# Patient Record
Sex: FEMALE | Race: WHITE | NOT HISPANIC OR LATINO | Employment: FULL TIME | URBAN - METROPOLITAN AREA
[De-identification: names, ages, dates, MRNs, and addresses within clinical notes are randomized per-mention and may not be internally consistent; named-entity substitution may affect disease eponyms.]

---

## 2023-09-15 ENCOUNTER — OFFICE VISIT (OUTPATIENT)
Dept: PRIMARY CARE | Facility: CLINIC | Age: 43
End: 2023-09-15
Payer: COMMERCIAL

## 2023-09-15 VITALS
BODY MASS INDEX: 32.78 KG/M2 | HEIGHT: 66 IN | WEIGHT: 204 LBS | SYSTOLIC BLOOD PRESSURE: 124 MMHG | DIASTOLIC BLOOD PRESSURE: 82 MMHG | HEART RATE: 73 BPM

## 2023-09-15 DIAGNOSIS — E66.9 CLASS 1 OBESITY WITHOUT SERIOUS COMORBIDITY WITH BODY MASS INDEX (BMI) OF 32.0 TO 32.9 IN ADULT, UNSPECIFIED OBESITY TYPE: ICD-10-CM

## 2023-09-15 DIAGNOSIS — Z00.00 ENCOUNTER FOR PREVENTIVE HEALTH EXAMINATION: Primary | ICD-10-CM

## 2023-09-15 DIAGNOSIS — Z11.59 ENCOUNTER FOR HEPATITIS C SCREENING TEST FOR LOW RISK PATIENT: ICD-10-CM

## 2023-09-15 DIAGNOSIS — Z12.31 VISIT FOR SCREENING MAMMOGRAM: ICD-10-CM

## 2023-09-15 DIAGNOSIS — G25.81 RESTLESS LEG: ICD-10-CM

## 2023-09-15 PROBLEM — F32.A DEPRESSION: Status: ACTIVE | Noted: 2023-09-15

## 2023-09-15 PROBLEM — G47.00 INSOMNIA: Status: ACTIVE | Noted: 2023-09-15

## 2023-09-15 PROBLEM — K21.9 GASTROESOPHAGEAL REFLUX DISEASE: Status: ACTIVE | Noted: 2023-09-15

## 2023-09-15 PROBLEM — E55.9 VITAMIN D INSUFFICIENCY: Status: ACTIVE | Noted: 2023-09-15

## 2023-09-15 PROBLEM — N62 BREAST HYPERTROPHY: Status: ACTIVE | Noted: 2023-09-15

## 2023-09-15 PROBLEM — F41.9 ANXIETY DISORDER: Status: ACTIVE | Noted: 2023-09-15

## 2023-09-15 PROBLEM — G43.909 MIGRAINES: Status: ACTIVE | Noted: 2023-09-15

## 2023-09-15 PROCEDURE — 3008F BODY MASS INDEX DOCD: CPT | Performed by: STUDENT IN AN ORGANIZED HEALTH CARE EDUCATION/TRAINING PROGRAM

## 2023-09-15 PROCEDURE — 99386 PREV VISIT NEW AGE 40-64: CPT | Performed by: STUDENT IN AN ORGANIZED HEALTH CARE EDUCATION/TRAINING PROGRAM

## 2023-09-15 PROCEDURE — 1036F TOBACCO NON-USER: CPT | Performed by: STUDENT IN AN ORGANIZED HEALTH CARE EDUCATION/TRAINING PROGRAM

## 2023-09-15 PROCEDURE — 99203 OFFICE O/P NEW LOW 30 MIN: CPT | Performed by: STUDENT IN AN ORGANIZED HEALTH CARE EDUCATION/TRAINING PROGRAM

## 2023-09-15 RX ORDER — HYDROXYZINE PAMOATE 50 MG/1
50 CAPSULE ORAL NIGHTLY
COMMUNITY
Start: 2023-07-21 | End: 2024-05-29 | Stop reason: ALTCHOICE

## 2023-09-15 RX ORDER — TOPIRAMATE 50 MG/1
50 TABLET, FILM COATED ORAL DAILY
COMMUNITY
Start: 2023-07-21 | End: 2024-05-29 | Stop reason: SINTOL

## 2023-09-15 RX ORDER — BUPROPION HYDROCHLORIDE 300 MG/1
300 TABLET ORAL
COMMUNITY
End: 2024-05-29 | Stop reason: ALTCHOICE

## 2023-09-15 RX ORDER — FLUOXETINE HYDROCHLORIDE 20 MG/1
20 CAPSULE ORAL DAILY
COMMUNITY
Start: 2017-05-18 | End: 2024-05-29 | Stop reason: ALTCHOICE

## 2023-09-15 ASSESSMENT — PATIENT HEALTH QUESTIONNAIRE - PHQ9
1. LITTLE INTEREST OR PLEASURE IN DOING THINGS: NOT AT ALL
SUM OF ALL RESPONSES TO PHQ9 QUESTIONS 1 AND 2: 0
2. FEELING DOWN, DEPRESSED OR HOPELESS: NOT AT ALL

## 2023-09-15 NOTE — PROGRESS NOTES
Mary Dickens is a 42 y.o. year old female presenting for Annual Exam       HPI:    Patient presenting with several complaints today.  Extreme fatigue has been bothering her for the past several months, feels like she could sleep all day, and can sleep at the drop of a hat.    Drinks a lot of caffeine, 2-3 monsters a day for the past 2-3 months. Also starbucks and linda coffee, coffee in the morning, coffee late in the evening as well as some nights as late as 9 PM.  Endorsing some dizziness and palpitations occasionally. Drinks water with sugar free flavoring packets, 2-3 yetis full per day  No other drugs, limited alcohol, nonsmoker    Works every day, 2 jobs per day, up at 6 am, home at 10 pm.  Works at [employer] and [employer], lifting boxes, walks in the evening.  Single mom to 3 children    RLS has been really bad lately.     Has gained weight a bit, never taken any weight loss medications before.  Diet - sugary foods more lately, likes salad and veggies, not a lot of protein, baked potatoes lately.    Bruising easily as well, last 6 months, last for a long time, not on any blood thinners  Had ablation and tubes tied in 2011, doesn't get periods, no heavy menstrual bleeding.    Has not had labs in a long time.  Due for linda.  Last pap was last December with obgyn.    No regular bowel movements, BM once per week. Has tried dulcolax which helps, miralax doesn't help.     No chest pain, shortness of breath, nausea, vomiting, abodminal pain, muscle aches.        ROS:   All pertinent positive symptoms are included in history of present illness.    All other systems have been reviewed and are negative and noncontributory to this patient's current ailments.    Current Outpatient Medications   Medication Sig Dispense Refill    FLUoxetine (PROzac) 20 mg capsule Take 1 capsule (20 mg) by mouth once daily.      hydrOXYzine pamoate (Vistaril) 50 mg capsule Take 1 capsule (50 mg) by mouth once daily at bedtime.       "topiramate (Topamax) 50 mg tablet Take 1 tablet (50 mg) by mouth once daily.      buPROPion XL (Wellbutrin XL) 300 mg 24 hr tablet Take 1 tablet (300 mg) by mouth once daily.       No current facility-administered medications for this visit.       OBJECTIVE  Visit Vitals  /82   Pulse 73   Ht 1.676 m (5' 6\")   Wt 92.5 kg (204 lb)   BMI 32.93 kg/m²   Smoking Status Never   BSA 2.08 m²        Physical Exam:  GENERAL: Alert, oriented, pleasant, in no acute distress  HEENT: Head normocephalic, atraumatic  CV: Heart with regular rate and rhythm, normal S1/S2, no murmurs; normal peripheral pulses- radial and dorsalis pedis palpable  LUNGS: CTAB without wheezing, rhonchi or rales; good respiratory effort, no increased work of breathing  ABDOMEN: soft, non-tender, non-distended, no masses appreciated; +BS in all four quadrants  EXTREMITIES: no edema, no cyanosis  PSYCH: Appropriate mood and affect  SKIN: No rashes or lesions appreciated    Assessment/Plan   Diagnoses and all orders for this visit:  Encounter for preventive health examination  A complete history and physical was performed today.  Vaccines are up to date.  Mammogram ordered  PAP is up to date.  Fasting labs ordered today include:  -     CBC; Future  -     Comprehensive Metabolic Panel; Future  -     Hemoglobin A1C; Future  -     Lipid Panel; Future  -     TSH with reflex to Free T4 if abnormal; Future  Encounter for hepatitis C screening test for low risk patient  -     Hepatitis C Antibody; Future  Class 1 obesity without serious comorbidity with body mass index (BMI) of 32.0 to 32.9 in adult, unspecified obesity type   - Discussed with patient need for lifestyle changes, including improving her diet, decreasing sugar and increasing dedicated exercise as her schedule allows.   - Will check A1c to see if any insulin resistance component and thyroid levels. Can try a GLP1 in the future, see if insurance covers it to help with weight loss.  Restless leg  - "     Iron and TIBC; Future  -     Ferritin; Future  -     Will confirm no iron deficiency prior to offering medication for restless legs. Can do gabapentin if her values are within normal limits.  Visit for screening mammogram  -     BI mammo bilateral screening tomosynthesis; Future

## 2023-09-18 ENCOUNTER — LAB (OUTPATIENT)
Dept: LAB | Facility: LAB | Age: 43
End: 2023-09-18
Payer: COMMERCIAL

## 2023-09-18 DIAGNOSIS — G25.81 RESTLESS LEG: ICD-10-CM

## 2023-09-18 DIAGNOSIS — Z00.00 ENCOUNTER FOR PREVENTIVE HEALTH EXAMINATION: ICD-10-CM

## 2023-09-18 DIAGNOSIS — Z11.59 ENCOUNTER FOR HEPATITIS C SCREENING TEST FOR LOW RISK PATIENT: ICD-10-CM

## 2023-09-18 LAB
ALANINE AMINOTRANSFERASE (SGPT) (U/L) IN SER/PLAS: 18 U/L (ref 7–45)
ALBUMIN (G/DL) IN SER/PLAS: 4 G/DL (ref 3.4–5)
ALKALINE PHOSPHATASE (U/L) IN SER/PLAS: 44 U/L (ref 33–110)
ANION GAP IN SER/PLAS: 10 MMOL/L (ref 10–20)
ASPARTATE AMINOTRANSFERASE (SGOT) (U/L) IN SER/PLAS: 16 U/L (ref 9–39)
BILIRUBIN TOTAL (MG/DL) IN SER/PLAS: 0.5 MG/DL (ref 0–1.2)
CALCIUM (MG/DL) IN SER/PLAS: 8.8 MG/DL (ref 8.6–10.3)
CARBON DIOXIDE, TOTAL (MMOL/L) IN SER/PLAS: 24 MMOL/L (ref 21–32)
CHLORIDE (MMOL/L) IN SER/PLAS: 109 MMOL/L (ref 98–107)
CHOLESTEROL (MG/DL) IN SER/PLAS: 143 MG/DL (ref 0–199)
CHOLESTEROL IN HDL (MG/DL) IN SER/PLAS: 58.3 MG/DL
CHOLESTEROL/HDL RATIO: 2.5
CREATININE (MG/DL) IN SER/PLAS: 0.97 MG/DL (ref 0.5–1.05)
ERYTHROCYTE DISTRIBUTION WIDTH (RATIO) BY AUTOMATED COUNT: 13.7 % (ref 11.5–14.5)
ERYTHROCYTE MEAN CORPUSCULAR HEMOGLOBIN CONCENTRATION (G/DL) BY AUTOMATED: 31.9 G/DL (ref 32–36)
ERYTHROCYTE MEAN CORPUSCULAR VOLUME (FL) BY AUTOMATED COUNT: 96 FL (ref 80–100)
ERYTHROCYTES (10*6/UL) IN BLOOD BY AUTOMATED COUNT: 4.55 X10E12/L (ref 4–5.2)
ESTIMATED AVERAGE GLUCOSE FOR HBA1C: 111 MG/DL
FERRITIN (UG/LL) IN SER/PLAS: 37 UG/L (ref 8–150)
GFR FEMALE: 75 ML/MIN/1.73M2
GLUCOSE (MG/DL) IN SER/PLAS: 94 MG/DL (ref 74–99)
HEMATOCRIT (%) IN BLOOD BY AUTOMATED COUNT: 43.6 % (ref 36–46)
HEMOGLOBIN (G/DL) IN BLOOD: 13.9 G/DL (ref 12–16)
HEMOGLOBIN A1C/HEMOGLOBIN TOTAL IN BLOOD: 5.5 %
IRON (UG/DL) IN SER/PLAS: 89 UG/DL (ref 35–150)
IRON BINDING CAPACITY (UG/DL) IN SER/PLAS: 314 UG/DL (ref 240–445)
IRON SATURATION (%) IN SER/PLAS: 28 % (ref 25–45)
LDL: 73 MG/DL (ref 0–99)
LEUKOCYTES (10*3/UL) IN BLOOD BY AUTOMATED COUNT: 7.8 X10E9/L (ref 4.4–11.3)
PLATELETS (10*3/UL) IN BLOOD AUTOMATED COUNT: 249 X10E9/L (ref 150–450)
POTASSIUM (MMOL/L) IN SER/PLAS: 4 MMOL/L (ref 3.5–5.3)
PROTEIN TOTAL: 7.2 G/DL (ref 6.4–8.2)
SODIUM (MMOL/L) IN SER/PLAS: 139 MMOL/L (ref 136–145)
THYROTROPIN (MIU/L) IN SER/PLAS BY DETECTION LIMIT <= 0.05 MIU/L: 2.48 MIU/L (ref 0.44–3.98)
TRIGLYCERIDE (MG/DL) IN SER/PLAS: 59 MG/DL (ref 0–149)
UREA NITROGEN (MG/DL) IN SER/PLAS: 18 MG/DL (ref 6–23)
VLDL: 12 MG/DL (ref 0–40)

## 2023-09-18 PROCEDURE — 84443 ASSAY THYROID STIM HORMONE: CPT

## 2023-09-18 PROCEDURE — 85027 COMPLETE CBC AUTOMATED: CPT

## 2023-09-18 PROCEDURE — 83036 HEMOGLOBIN GLYCOSYLATED A1C: CPT

## 2023-09-18 PROCEDURE — 80061 LIPID PANEL: CPT

## 2023-09-18 PROCEDURE — 80053 COMPREHEN METABOLIC PANEL: CPT

## 2023-09-18 PROCEDURE — 83550 IRON BINDING TEST: CPT

## 2023-09-18 PROCEDURE — 86803 HEPATITIS C AB TEST: CPT

## 2023-09-18 PROCEDURE — 82728 ASSAY OF FERRITIN: CPT

## 2023-09-18 PROCEDURE — 36415 COLL VENOUS BLD VENIPUNCTURE: CPT

## 2023-09-18 PROCEDURE — 83540 ASSAY OF IRON: CPT

## 2023-09-19 DIAGNOSIS — E66.9 CLASS 1 OBESITY WITHOUT SERIOUS COMORBIDITY WITH BODY MASS INDEX (BMI) OF 32.0 TO 32.9 IN ADULT, UNSPECIFIED OBESITY TYPE: Primary | ICD-10-CM

## 2023-09-19 LAB — HEPATITIS C VIRUS AB PRESENCE IN SERUM: NONREACTIVE

## 2023-09-19 RX ORDER — METFORMIN HYDROCHLORIDE 500 MG/1
TABLET, EXTENDED RELEASE ORAL
Qty: 166 TABLET | Refills: 0 | Status: SHIPPED
Start: 2023-09-19 | End: 2024-05-29

## 2024-05-17 NOTE — PROGRESS NOTES
No chief complaint on file.      History Of Present Illness    Subjective   Mary Dickens is here for follow up of anal dysplasia.  she {has/has not:20194} has not noticed any new lesions or changes. She was last seen in this clinic 9/2022.      HIV status: {POSITIVE/NEGATIVE/NOT PERFORMED:50545}- CD4 and VL   Smoking status: {smoking status:40481}    Previous High Resolution Anoscopies and Cytologies have shown:     Colonoscopy 10/2017 ([facility])- The examined portion of the ileum was normal. The entire examined colon is normal. There is no endoscopic evidence of bleeding, diverticula, inflammation, mass, polyps, stricture or ulcerations in the entire colon. The distal rectum and anal verge are normal on retroflexion view. Small non-thrombosed external hemorrhoids found on perianal exam. No specimens collected. Repeat in 5 years.     11/2021 HRA: LSIL     Past Medical History  She  Past Medical History:   Diagnosis Date    Anxiety     Depression     GERD (gastroesophageal reflux disease)     Insomnia     Migraines        Surgical History  She  Past Surgical History:   Procedure Laterality Date    CHOLECYSTECTOMY  09/04/2015    Cholecystectomy    ENDOMETRIAL ABLATION  09/04/2015    [facility] Thermal Endometrial Ablation    FOOT SURGERY  09/04/2015    Foot Surgery    TUBAL LIGATION  09/04/2015    Tubal Ligation        Social History  She reports that she has never smoked. She has never used smokeless tobacco. No history on file for alcohol use and drug use.    Family History  No family history on file.     Allergies  Patient has no known allergies.    Home Medications  Prior to Admission medications    Medication Sig Start Date End Date Taking? Authorizing Provider   buPROPion XL (Wellbutrin XL) 300 mg 24 hr tablet Take 1 tablet (300 mg) by mouth once daily.    Historical Provider, MD   FLUoxetine (PROzac) 20 mg capsule Take 1 capsule (20 mg) by mouth once daily. 5/18/17   Historical Provider, MD    hydrOXYzine pamoate (Vistaril) 50 mg capsule Take 1 capsule (50 mg) by mouth once daily at bedtime. 7/21/23   Historical Provider, MD   metFORMIN XR (Glucophage-XR) 500 mg 24 hr tablet Take 1 tablet (500 mg) by mouth once daily in the evening. Take with meals for 14 days, THEN 2 tablets (1,000 mg) once daily in the evening. Take with meals. Do not crush, chew, or split.. 9/19/23 12/18/23  Nicci Willis,    topiramate (Topamax) 50 mg tablet Take 1 tablet (50 mg) by mouth once daily. 7/21/23   Historical Provider, MD       Review of Systems     Physical Exam    Perineal/FRANCISCA:  Perineum:   FRANCISCA:   Tone:     Procedures    Last Recorded Vitals  There were no vitals taken for this visit.      Assessment and Plan  43 y.o. female here for follow up of anal dysplasia.     Depending on anal cytology results may recommend HRA. If cytology normal, will follow up with clinical examination in 6 months.     I emphasized the significant possibility of recurrence and the need for close follow-up. We discussed barrier protection when sexually active, non-smoking, and taking HIV medications.

## 2024-05-23 ENCOUNTER — APPOINTMENT (OUTPATIENT)
Dept: SURGERY | Facility: CLINIC | Age: 44
End: 2024-05-23
Payer: COMMERCIAL

## 2024-05-24 ENCOUNTER — HOSPITAL ENCOUNTER (OUTPATIENT)
Dept: RADIOLOGY | Facility: HOSPITAL | Age: 44
Discharge: HOME | End: 2024-05-24
Payer: COMMERCIAL

## 2024-05-24 VITALS — HEIGHT: 66 IN | WEIGHT: 190 LBS | BODY MASS INDEX: 30.53 KG/M2

## 2024-05-24 DIAGNOSIS — Z12.31 VISIT FOR SCREENING MAMMOGRAM: ICD-10-CM

## 2024-05-24 PROCEDURE — 77063 BREAST TOMOSYNTHESIS BI: CPT | Mod: BILATERAL PROCEDURE | Performed by: RADIOLOGY

## 2024-05-24 PROCEDURE — 77067 SCR MAMMO BI INCL CAD: CPT | Mod: BILATERAL PROCEDURE | Performed by: RADIOLOGY

## 2024-05-24 PROCEDURE — 77067 SCR MAMMO BI INCL CAD: CPT

## 2024-05-25 ASSESSMENT — PROMIS GLOBAL HEALTH SCALE
RATE_SOCIAL_SATISFACTION: FAIR
RATE_AVERAGE_PAIN: 5
RATE_GENERAL_HEALTH: VERY GOOD
RATE_AVERAGE_FATIGUE: SEVERE
EMOTIONAL_PROBLEMS: OFTEN
CARRYOUT_SOCIAL_ACTIVITIES: FAIR
RATE_QUALITY_OF_LIFE: GOOD
CARRYOUT_PHYSICAL_ACTIVITIES: MOSTLY
RATE_MENTAL_HEALTH: FAIR
RATE_PHYSICAL_HEALTH: GOOD

## 2024-05-29 ENCOUNTER — OFFICE VISIT (OUTPATIENT)
Dept: PRIMARY CARE | Facility: CLINIC | Age: 44
End: 2024-05-29
Payer: COMMERCIAL

## 2024-05-29 VITALS
SYSTOLIC BLOOD PRESSURE: 124 MMHG | HEART RATE: 84 BPM | HEIGHT: 66 IN | DIASTOLIC BLOOD PRESSURE: 84 MMHG | WEIGHT: 222 LBS | BODY MASS INDEX: 35.68 KG/M2

## 2024-05-29 DIAGNOSIS — G25.81 RESTLESS LEG SYNDROME: ICD-10-CM

## 2024-05-29 DIAGNOSIS — G43.009 MIGRAINE WITHOUT AURA AND WITHOUT STATUS MIGRAINOSUS, NOT INTRACTABLE: ICD-10-CM

## 2024-05-29 DIAGNOSIS — F51.01 PRIMARY INSOMNIA: ICD-10-CM

## 2024-05-29 DIAGNOSIS — E66.9 CLASS 2 OBESITY WITHOUT SERIOUS COMORBIDITY WITH BODY MASS INDEX (BMI) OF 35.0 TO 35.9 IN ADULT, UNSPECIFIED OBESITY TYPE: Primary | ICD-10-CM

## 2024-05-29 PROCEDURE — 99214 OFFICE O/P EST MOD 30 MIN: CPT | Performed by: STUDENT IN AN ORGANIZED HEALTH CARE EDUCATION/TRAINING PROGRAM

## 2024-05-29 PROCEDURE — 1036F TOBACCO NON-USER: CPT | Performed by: STUDENT IN AN ORGANIZED HEALTH CARE EDUCATION/TRAINING PROGRAM

## 2024-05-29 PROCEDURE — 3008F BODY MASS INDEX DOCD: CPT | Performed by: STUDENT IN AN ORGANIZED HEALTH CARE EDUCATION/TRAINING PROGRAM

## 2024-05-29 RX ORDER — SEMAGLUTIDE 0.25 MG/.5ML
0.25 INJECTION, SOLUTION SUBCUTANEOUS
Qty: 2 ML | Refills: 0 | Status: SHIPPED
Start: 2024-05-29 | End: 2024-06-04

## 2024-05-29 RX ORDER — SEMAGLUTIDE 0.5 MG/.5ML
0.5 INJECTION, SOLUTION SUBCUTANEOUS
Qty: 2 ML | Refills: 0 | Status: SHIPPED
Start: 2024-07-01 | End: 2024-06-04

## 2024-05-29 RX ORDER — RIZATRIPTAN BENZOATE 10 MG/1
10 TABLET ORAL ONCE AS NEEDED
Qty: 9 TABLET | Refills: 1 | Status: SHIPPED | OUTPATIENT
Start: 2024-05-29

## 2024-05-29 RX ORDER — ROPINIROLE 0.25 MG/1
TABLET, FILM COATED ORAL
Qty: 180 TABLET | Refills: 0 | Status: SHIPPED | OUTPATIENT
Start: 2024-05-29

## 2024-05-29 ASSESSMENT — PATIENT HEALTH QUESTIONNAIRE - PHQ9
SUM OF ALL RESPONSES TO PHQ9 QUESTIONS 1 AND 2: 0
1. LITTLE INTEREST OR PLEASURE IN DOING THINGS: NOT AT ALL
2. FEELING DOWN, DEPRESSED OR HOPELESS: NOT AT ALL

## 2024-05-29 NOTE — PROGRESS NOTES
"Mary Dickens is a 43 y.o. year old female presenting for Weight Loss       HPI:  1. Weight loss  At last visit 09/15/23 patient reported she was gaining weight and wanted assistance in losing weight.  Had discussed dietary changes such as decreased sugar and increased protein which she has has tried to incorporate but states it has been difficult due to her work schedule.  Also started metformin at the time which made her feel \"terrible\".  Her A1c and lipid panel were normal.   Today she is at 222 which is up ~20 lbs from last visit and feels like her weight is out of control.  Would like to try Weygovy.     2. Restless leg syndrome  At last visit she had endorsed worsening symptoms of RLS.  CBC, Iron and TIBC, Ferritin were all normal at that time.  She continues to have significant symptoms and is hesitant to try gabapentin as she has had significant side effects from it in the past.    3. Fatigue/insomnia  At 09/23 visit patient was endorsing significant fatigue.  She was working two jobs, up at 6 am and home at 10 pm with jobs requiring physical activity (lifting boxes).  Was reliant on 2-3 monsters/day and several coffees through the day including some in the evening/late night.   Today she reports she has cut back on caffeine (1 cup of coffee per day) but is still having trouble sleeping due to a highly irregular schedule (now working 4 jobs in total).  In the past she has tried trazodone and melatonin, both with minimal effect and significant side effects.    4. Migraines  Has 2 migraine days per week.  Was previously on Topamax for migraine control through her psychiatrist but stopped it due to side effects.  Denies ever having tried a triptan.     5. Anxiety/depression  No longer taking Wellbutrin or Prozac or seeing her psychiatrist.   States that her symptoms in the past were situational due to losing her mother (2019) and going through a divorce.  She now feels like her mental health is much improved " "and no longer needs medication.    6. Health maintenance  Open to receiving Tdap; due.    ROS:   All pertinent positive symptoms are included in history of present illness.    All other systems have been reviewed and are negative and noncontributory to this patient's current ailments.    Current Outpatient Medications   Medication Sig Dispense Refill    buPROPion XL (Wellbutrin XL) 300 mg 24 hr tablet Take 1 tablet (300 mg) by mouth once daily.      FLUoxetine (PROzac) 20 mg capsule Take 1 capsule (20 mg) by mouth once daily.      hydrOXYzine pamoate (Vistaril) 50 mg capsule Take 1 capsule (50 mg) by mouth once daily at bedtime.      metFORMIN XR (Glucophage-XR) 500 mg 24 hr tablet Take 1 tablet (500 mg) by mouth once daily in the evening. Take with meals for 14 days, THEN 2 tablets (1,000 mg) once daily in the evening. Take with meals. Do not crush, chew, or split.. 166 tablet 0     No current facility-administered medications for this visit.       OBJECTIVE  Visit Vitals  /84   Pulse 84   Ht 1.676 m (5' 6\")   Wt 101 kg (222 lb)   BMI 35.83 kg/m²   OB Status Ablation   Smoking Status Never   BSA 2.17 m²        Physical Exam:  GENERAL: Alert, oriented, pleasant, in no acute distress  HEENT: Head normocephalic, atraumatic  CV: Heart with regular rate and rhythm, normal S1/S2, no murmurs; normal peripheral pulses- radial and dorsalis pedis palpable  LUNGS: CTAB without wheezing, rhonchi or rales; good respiratory effort, no increased work of breathing  EXTREMITIES: no edema, no cyanosis  PSYCH: Appropriate mood and affect  SKIN: No rashes or lesions appreciated    Assessment/Plan   Diagnoses and all orders for this visit:  Class 2 obesity without serious comorbidity with body mass index (BMI) of 35.0 to 35.9 in adult, unspecified obesity type  We will stop metformin as she was experiencing significant side effects and begin Weygovy.  - Weygovy 0.25 mg/mL injection once weekly for four weeks  - Increase to 0.5 " mg/mL injection once weekly after four weeks  Restless leg syndrome  Normal CBC and iron studies within the last year.  Previous side effects to use of gabapentin.  - Ropinirole titrate up to 0.5mg every day    - Stop this medication and call the office if noticing an uncharacteristic increase in spending (example: binge shopping) or risk-taking behavior (example: gambling)  Primary insomnia  Patient succeeded in cutting back on previously excessive amounts caffeine.  Issues with sleeping at this time are likely due to irregularity of sleep schedule with minimal time to sleep, along with uncontrolled RLS.  We will reassess after controlling RLS.  Migraine without aura and without status migrainosus, not intractable  Previously on topiramate but stopped due to side effects.  Denies having tried a triptan medication.  Currently experiencing two migraine days per week.  - Begin rizatriptan 10mg once daily as needed    Thank you for letting us be a part of your care team.    Please follow up in 3 months or sooner as needed.

## 2024-05-30 ENCOUNTER — TELEPHONE (OUTPATIENT)
Dept: PRIMARY CARE | Facility: CLINIC | Age: 44
End: 2024-05-30
Payer: COMMERCIAL

## 2024-05-30 ENCOUNTER — PATIENT MESSAGE (OUTPATIENT)
Dept: PRIMARY CARE | Facility: CLINIC | Age: 44
End: 2024-05-30
Payer: COMMERCIAL

## 2024-05-30 DIAGNOSIS — E66.9 CLASS 2 OBESITY WITHOUT SERIOUS COMORBIDITY WITH BODY MASS INDEX (BMI) OF 35.0 TO 35.9 IN ADULT, UNSPECIFIED OBESITY TYPE: Primary | ICD-10-CM

## 2024-06-03 RX ORDER — PHENTERMINE AND TOPIRAMATE 3.75; 23 MG/1; MG/1
CAPSULE, EXTENDED RELEASE ORAL
Qty: 180 CAPSULE | Refills: 0 | Status: SHIPPED
Start: 2024-06-03 | End: 2024-06-04

## 2024-06-04 DIAGNOSIS — E66.9 CLASS 1 OBESITY WITHOUT SERIOUS COMORBIDITY WITH BODY MASS INDEX (BMI) OF 30.0 TO 30.9 IN ADULT, UNSPECIFIED OBESITY TYPE: Primary | ICD-10-CM

## 2024-06-04 RX ORDER — TOPIRAMATE 25 MG/1
TABLET ORAL
Qty: 180 TABLET | Refills: 0 | Status: SHIPPED | OUTPATIENT
Start: 2024-06-04

## 2024-07-02 ENCOUNTER — APPOINTMENT (OUTPATIENT)
Dept: SURGERY | Facility: CLINIC | Age: 44
End: 2024-07-02
Payer: COMMERCIAL

## 2024-11-11 ENCOUNTER — APPOINTMENT (OUTPATIENT)
Dept: PRIMARY CARE | Facility: CLINIC | Age: 44
End: 2024-11-11
Payer: COMMERCIAL

## 2024-11-11 ENCOUNTER — LAB (OUTPATIENT)
Dept: LAB | Facility: LAB | Age: 44
End: 2024-11-11
Payer: COMMERCIAL

## 2024-11-11 VITALS
HEART RATE: 92 BPM | WEIGHT: 210 LBS | SYSTOLIC BLOOD PRESSURE: 124 MMHG | HEIGHT: 66 IN | DIASTOLIC BLOOD PRESSURE: 74 MMHG | BODY MASS INDEX: 33.75 KG/M2

## 2024-11-11 DIAGNOSIS — E66.811 CLASS 1 OBESITY WITHOUT SERIOUS COMORBIDITY WITH BODY MASS INDEX (BMI) OF 33.0 TO 33.9 IN ADULT, UNSPECIFIED OBESITY TYPE: ICD-10-CM

## 2024-11-11 DIAGNOSIS — F51.01 PRIMARY INSOMNIA: ICD-10-CM

## 2024-11-11 DIAGNOSIS — Z00.00 ENCOUNTER FOR PREVENTIVE HEALTH EXAMINATION: Primary | ICD-10-CM

## 2024-11-11 DIAGNOSIS — Z00.00 ENCOUNTER FOR PREVENTIVE HEALTH EXAMINATION: ICD-10-CM

## 2024-11-11 LAB
ALBUMIN SERPL BCP-MCNC: 3.9 G/DL (ref 3.4–5)
ALP SERPL-CCNC: 54 U/L (ref 33–110)
ALT SERPL W P-5'-P-CCNC: 24 U/L (ref 7–45)
ANION GAP SERPL CALC-SCNC: 10 MMOL/L (ref 10–20)
AST SERPL W P-5'-P-CCNC: 23 U/L (ref 9–39)
BILIRUB SERPL-MCNC: 0.5 MG/DL (ref 0–1.2)
BUN SERPL-MCNC: 21 MG/DL (ref 6–23)
CALCIUM SERPL-MCNC: 9.2 MG/DL (ref 8.6–10.3)
CHLORIDE SERPL-SCNC: 107 MMOL/L (ref 98–107)
CHOLEST SERPL-MCNC: 163 MG/DL (ref 0–199)
CHOLESTEROL/HDL RATIO: 3.1
CO2 SERPL-SCNC: 29 MMOL/L (ref 21–32)
CREAT SERPL-MCNC: 0.9 MG/DL (ref 0.5–1.05)
EGFRCR SERPLBLD CKD-EPI 2021: 82 ML/MIN/1.73M*2
ERYTHROCYTE [DISTWIDTH] IN BLOOD BY AUTOMATED COUNT: 14.5 % (ref 11.5–14.5)
GLUCOSE SERPL-MCNC: 107 MG/DL (ref 74–99)
HCT VFR BLD AUTO: 40.9 % (ref 36–46)
HDLC SERPL-MCNC: 51.9 MG/DL
HGB BLD-MCNC: 12.7 G/DL (ref 12–16)
LDLC SERPL CALC-MCNC: 98 MG/DL
MCH RBC QN AUTO: 30 PG (ref 26–34)
MCHC RBC AUTO-ENTMCNC: 31.1 G/DL (ref 32–36)
MCV RBC AUTO: 97 FL (ref 80–100)
NON HDL CHOLESTEROL: 111 MG/DL (ref 0–149)
NRBC BLD-RTO: 0 /100 WBCS (ref 0–0)
PLATELET # BLD AUTO: 275 X10*3/UL (ref 150–450)
POTASSIUM SERPL-SCNC: 4.7 MMOL/L (ref 3.5–5.3)
PROT SERPL-MCNC: 6.7 G/DL (ref 6.4–8.2)
RBC # BLD AUTO: 4.23 X10*6/UL (ref 4–5.2)
SODIUM SERPL-SCNC: 141 MMOL/L (ref 136–145)
TRIGL SERPL-MCNC: 66 MG/DL (ref 0–149)
TSH SERPL-ACNC: 2.86 MIU/L (ref 0.44–3.98)
VLDL: 13 MG/DL (ref 0–40)
WBC # BLD AUTO: 6.9 X10*3/UL (ref 4.4–11.3)

## 2024-11-11 PROCEDURE — 99214 OFFICE O/P EST MOD 30 MIN: CPT | Performed by: STUDENT IN AN ORGANIZED HEALTH CARE EDUCATION/TRAINING PROGRAM

## 2024-11-11 PROCEDURE — 3008F BODY MASS INDEX DOCD: CPT | Performed by: STUDENT IN AN ORGANIZED HEALTH CARE EDUCATION/TRAINING PROGRAM

## 2024-11-11 PROCEDURE — 85027 COMPLETE CBC AUTOMATED: CPT

## 2024-11-11 PROCEDURE — 83036 HEMOGLOBIN GLYCOSYLATED A1C: CPT

## 2024-11-11 PROCEDURE — 36415 COLL VENOUS BLD VENIPUNCTURE: CPT

## 2024-11-11 PROCEDURE — 80053 COMPREHEN METABOLIC PANEL: CPT

## 2024-11-11 PROCEDURE — 1036F TOBACCO NON-USER: CPT | Performed by: STUDENT IN AN ORGANIZED HEALTH CARE EDUCATION/TRAINING PROGRAM

## 2024-11-11 PROCEDURE — 84443 ASSAY THYROID STIM HORMONE: CPT

## 2024-11-11 PROCEDURE — 99396 PREV VISIT EST AGE 40-64: CPT | Performed by: STUDENT IN AN ORGANIZED HEALTH CARE EDUCATION/TRAINING PROGRAM

## 2024-11-11 PROCEDURE — 80061 LIPID PANEL: CPT

## 2024-11-11 RX ORDER — HYDROXYZINE HYDROCHLORIDE 25 MG/1
TABLET, FILM COATED ORAL
Qty: 180 TABLET | Refills: 0 | Status: SHIPPED | OUTPATIENT
Start: 2024-11-11

## 2024-11-11 RX ORDER — NALTREXONE HYDROCHLORIDE AND BUPROPION HYDROCHLORIDE 8; 90 MG/1; MG/1
TABLET, EXTENDED RELEASE ORAL
Qty: 360 TABLET | Refills: 0 | Status: SHIPPED | OUTPATIENT
Start: 2024-11-11

## 2024-11-11 NOTE — PROGRESS NOTES
"Subjective   Patient ID: Mary Dickens is a 44 y.o. female who presents for Annual Exam.    Past Medical, Surgical, and Family History reviewed and updated in chart.    Reviewed all medications by prescribing practitioner or clinical pharmacist (such as prescriptions, OTCs, herbal therapies and supplements) and documented in the medical record.    HPI  Poor sleep  She feels like she can't shut her brain off. She is averaging 2 hours of sleep per night and sleeps for about an hour at a time. She states that she is working 4 jobs right now and trying to get two kids through college. She says that she works 20 hours a day and rarely has time off. When she does have more time to sleep, she still does not sleep for long. She has \"tried everything otc.\" Trazadone gives her a headache. Melatonin makes her feel worse the next day.    2.  Weight management  She feels that her weight has been out of control recently. She has last 12 pounds since last visit, but she attributes this to not eating because of her busy life. Would like to try a medication. Has tried Metformin and it did not work, just made her tired. Wegovy and topamax not covered by insurance. She is interested in trialing Contrave.      3. Anxiety/depression  She feels that her anxiety/depression has been well controlled. She denies suicidal ideation.  No longer taking Wellbutrin or Prozac or seeing her psychiatrist.   States that her symptoms in the past were situational due to losing her mother (2019) and going through a divorce.  She now feels like her mental health is much improved and no longer needs medication.    4. Health maintenance  Up to date with mammogram  Declines flu vaccine       Review of Systems  All pertinent positive symptoms are included in the history of present illness.    All other systems have been reviewed and are negative and noncontributory to this patient's current ailments.    Past Medical History:   Diagnosis Date    Anxiety     " "Depression     GERD (gastroesophageal reflux disease)     Insomnia     Migraines      Past Surgical History:   Procedure Laterality Date    CHOLECYSTECTOMY  09/04/2015    Cholecystectomy    ENDOMETRIAL ABLATION  09/04/2015    [facility] Thermal Endometrial Ablation    FOOT SURGERY  09/04/2015    Foot Surgery    TUBAL LIGATION  09/04/2015    Tubal Ligation     Social History     Tobacco Use    Smoking status: Never    Smokeless tobacco: Never     Family History   Problem Relation Name Age of Onset    Breast cancer Mother      Breast cancer Maternal Grandmother       Immunization History   Administered Date(s) Administered    Pfizer Gray Cap SARS-CoV-2 03/12/2022    Pfizer Purple Cap SARS-CoV-2 09/20/2021, 10/11/2021    Tdap vaccine, age 7 year and older (BOOSTRIX, ADACEL) 07/08/2010     Current Outpatient Medications   Medication Instructions    hydrOXYzine HCL (Atarax) 25 mg tablet Take 1-2 tabs at night for sleep    naltrexone-bupropion (Contrave) 8-90 mg ER tablet Initiation: 1 tablet qAM days 1-7, then 1 tab BID days 8-14, then 2 tabs in AM + 1 tab in PM days 15-22, then 2 tabs BID.     No Known Allergies    Objective   Vitals:    11/11/24 0740   BP: 124/74   Pulse: 92   Weight: 95.3 kg (210 lb)   Height: 1.676 m (5' 6\")     Body mass index is 33.89 kg/m².    BP Readings from Last 3 Encounters:   11/11/24 124/74   05/29/24 124/84   09/15/23 124/82      Wt Readings from Last 3 Encounters:   11/11/24 95.3 kg (210 lb)   05/29/24 101 kg (222 lb)   05/24/24 86.2 kg (190 lb)        No visits with results within 1 Month(s) from this visit.   Latest known visit with results is:   Lab on 09/18/2023   Component Date Value    WBC 09/18/2023 7.8     RBC 09/18/2023 4.55     Hemoglobin 09/18/2023 13.9     Hematocrit 09/18/2023 43.6     MCV 09/18/2023 96     MCHC 09/18/2023 31.9 (L)     Platelets 09/18/2023 249     RDW 09/18/2023 13.7     Glucose 09/18/2023 94     Sodium 09/18/2023 139     Potassium 09/18/2023 4.0     " Chloride 09/18/2023 109 (H)     Bicarbonate 09/18/2023 24     Anion Gap 09/18/2023 10     Urea Nitrogen 09/18/2023 18     Creatinine 09/18/2023 0.97     GFR Female 09/18/2023 75     Calcium 09/18/2023 8.8     Albumin 09/18/2023 4.0     Alkaline Phosphatase 09/18/2023 44     Total Protein 09/18/2023 7.2     AST 09/18/2023 16     Total Bilirubin 09/18/2023 0.5     ALT (SGPT) 09/18/2023 18     Hemoglobin A1C 09/18/2023 5.5     Estimated Average Glucose 09/18/2023 111     Cholesterol 09/18/2023 143     HDL 09/18/2023 58.3     Cholesterol/HDL Ratio 09/18/2023 2.5     LDL 09/18/2023 73     VLDL 09/18/2023 12     Triglycerides 09/18/2023 59     TSH 09/18/2023 2.48     Hepatitis C Ab 09/18/2023 NONREACTIVE     Iron 09/18/2023 89     TIBC 09/18/2023 314     Iron Saturation 09/18/2023 28     Ferritin 09/18/2023 37      Physical Exam  CONSTITUTIONAL - well nourished, well developed, looks like stated age, in no acute distress, not ill-appearing, and not tired appearing  SKIN - normal skin color and pigmentation, normal skin turgor without rash, lesions, or nodules visualized  HEAD - no trauma, normocephalic  EYES - normal external exam  LUNG - clear to auscultation, no wheezing, no crackles and no rales, good effort  CARDIAC - regular rate and regular rhythm, no skipped beats, no murmur  EXTREMITIES - no edema, no deformities  NEUROLOGICAL - normal balance, normal motor, no ataxia  PSYCHIATRIC - alert, pleasant and cordial, age-appropriate     Assessment/Plan   Problem List Items Addressed This Visit       Insomnia  Since we are working with such a short window for sleep, will trial hydroxyzine since other sleep aids are meant to give 7-9 hours of sleep. She should check in with us if the hydroxyzine is not helpful    Relevant Medications    hydrOXYzine HCL (Atarax) 25 mg tablet    Obesity  Failed metformin and Topamax. She cannot afford Zepbound and Wegovy. Will trial Contrave. I sent it to the mail order for the medication     Relevant Medications    naltrexone-bupropion (Contrave) 8-90 mg ER tablet     Other Visit Diagnoses       Encounter for preventive health examination    -  Primary  A complete history and physical was performed today.  Mammogram is up to date.  PAP is up to date.  Fasting labs ordered today include:    Relevant Orders    CBC    Comprehensive Metabolic Panel    Hemoglobin A1C    Lipid Panel    TSH with reflex to Free T4 if abnormal

## 2024-11-12 LAB
EST. AVERAGE GLUCOSE BLD GHB EST-MCNC: 108 MG/DL
HBA1C MFR BLD: 5.4 %

## 2025-01-03 ENCOUNTER — OFFICE VISIT (OUTPATIENT)
Dept: URGENT CARE | Age: 45
End: 2025-01-03
Payer: COMMERCIAL

## 2025-01-03 VITALS
SYSTOLIC BLOOD PRESSURE: 143 MMHG | RESPIRATION RATE: 18 BRPM | HEART RATE: 73 BPM | OXYGEN SATURATION: 98 % | DIASTOLIC BLOOD PRESSURE: 90 MMHG | TEMPERATURE: 97.2 F

## 2025-01-03 DIAGNOSIS — R30.0 DYSURIA: Primary | ICD-10-CM

## 2025-01-03 LAB
POC APPEARANCE, URINE: ABNORMAL
POC BILIRUBIN, URINE: NEGATIVE
POC BLOOD, URINE: NEGATIVE
POC COLOR, URINE: ABNORMAL
POC GLUCOSE, URINE: NEGATIVE MG/DL
POC KETONES, URINE: NEGATIVE MG/DL
POC LEUKOCYTES, URINE: ABNORMAL
POC NITRITE,URINE: POSITIVE
POC PH, URINE: 6 PH
POC PROTEIN, URINE: NEGATIVE MG/DL
POC SPECIFIC GRAVITY, URINE: 1.01
POC UROBILINOGEN, URINE: 1 EU/DL
PREGNANCY TEST URINE, POC: NEGATIVE

## 2025-01-03 PROCEDURE — 87086 URINE CULTURE/COLONY COUNT: CPT

## 2025-01-03 RX ORDER — CEPHALEXIN 500 MG/1
500 CAPSULE ORAL 2 TIMES DAILY
Qty: 14 CAPSULE | Refills: 0 | Status: SHIPPED | OUTPATIENT
Start: 2025-01-03 | End: 2025-01-10

## 2025-01-03 ASSESSMENT — ENCOUNTER SYMPTOMS
FREQUENCY: 1
DYSURIA: 1
VOMITING: 0
CHILLS: 0
NAUSEA: 0
SWEATS: 0
HEMATURIA: 0
FLANK PAIN: 0

## 2025-01-03 NOTE — PROGRESS NOTES
Subjective   History of Present Illness: Mary Dickens is a 44 y.o. female. They present today with a chief complaint of Female Dysuria (Pt advised that she has had dysuria for the past 5 days. Pt is taking AZO.).      Past Medical History  Allergies as of 01/03/2025    (No Known Allergies)       (Not in a hospital admission)       Past Medical History:   Diagnosis Date    Anxiety     Depression     GERD (gastroesophageal reflux disease)     Insomnia     Migraines        Past Surgical History:   Procedure Laterality Date    CHOLECYSTECTOMY  09/04/2015    Cholecystectomy    ENDOMETRIAL ABLATION  09/04/2015    [facility] Thermal Endometrial Ablation    FOOT SURGERY  09/04/2015    Foot Surgery    TUBAL LIGATION  09/04/2015    Tubal Ligation        reports that she has never smoked. She has never used smokeless tobacco.    Review of Systems  Review of Systems                               Objective    Vitals:    01/03/25 0814   BP: 143/90   Pulse: 73   Resp: 18   Temp: 36.2 °C (97.2 °F)   SpO2: 98%     No LMP recorded. Patient has had an ablation.    Physical Exam  Vitals reviewed.   HENT:      Head: Normocephalic and atraumatic.      Nose: Nose normal.      Mouth/Throat:      Mouth: Mucous membranes are moist.   Eyes:      Extraocular Movements: Extraocular movements intact.      Conjunctiva/sclera: Conjunctivae normal.      Pupils: Pupils are equal, round, and reactive to light.   Cardiovascular:      Rate and Rhythm: Normal rate and regular rhythm.   Pulmonary:      Effort: Pulmonary effort is normal.      Breath sounds: Normal breath sounds.   Abdominal:      Tenderness: There is no right CVA tenderness or left CVA tenderness.   Skin:     General: Skin is warm.   Neurological:      Mental Status: She is alert and oriented to person, place, and time.   Psychiatric:         Mood and Affect: Mood normal.         Behavior: Behavior normal.             Point of Care Test & Imaging Results from this  visit  Results for orders placed or performed in visit on 01/03/25   POCT UA Automated manually resulted   Result Value Ref Range    POC Color, Urine Orange (A) Straw, Yellow, Light-Yellow    POC Appearance, Urine Cloudy (A) Clear    POC Glucose, Urine NEGATIVE NEGATIVE mg/dl    POC Bilirubin, Urine NEGATIVE NEGATIVE    POC Ketones, Urine NEGATIVE NEGATIVE mg/dl    POC Specific Gravity, Urine 1.015 1.005 - 1.035    POC Blood, Urine NEGATIVE NEGATIVE    POC PH, Urine 6.0 No Reference Range Established PH    POC Protein, Urine NEGATIVE NEGATIVE mg/dl    POC Urobilinogen, Urine 1.0 0.2, 1.0 EU/DL    Poc Nitrite, Urine POSITIVE (A) NEGATIVE    POC Leukocytes, Urine TRACE (A) NEGATIVE   POCT pregnancy, urine manually resulted   Result Value Ref Range    Preg Test, Ur Negative Negative      No results found.    Diagnostic study results (if any) were reviewed by Cortez Gu PA-C.    Assessment/Plan   Allergies, medications, history, and pertinent labs/EKGs/Imaging reviewed by Cortez Gu PA-C.     Medical Decision Making  - pos UA. Neg U HCG. Urine cx sent out. Will treat with keflex for UTI.  -         Patient is educated about their diagnoses.     -          Discussed medications benefits and adverse effects.     -          Answered all patient’s questions.     -          Patient will call 911 or go to the nearest ED if worsen symptoms .     -          Patient is agreeable to the plan of care and is deemed stable upon discharge.     -          Follow up with your primary care provider in two days.      Orders and Diagnoses  Diagnoses and all orders for this visit:  Dysuria  -     POCT UA Automated manually resulted  -     Urine Culture  -     POCT pregnancy, urine manually resulted  -     cephalexin (Keflex) 500 mg capsule; Take 1 capsule (500 mg) by mouth 2 times a day for 7 days.      Medical Admin Record      Patient disposition: Home    Electronically signed by Cortez Gu PA-C  8:28 AM

## 2025-01-05 LAB — BACTERIA UR CULT: NO GROWTH

## 2025-01-31 DIAGNOSIS — E66.811 CLASS 1 OBESITY WITHOUT SERIOUS COMORBIDITY WITH BODY MASS INDEX (BMI) OF 33.0 TO 33.9 IN ADULT, UNSPECIFIED OBESITY TYPE: ICD-10-CM

## 2025-02-03 RX ORDER — NALTREXONE HYDROCHLORIDE AND BUPROPION HYDROCHLORIDE 8; 90 MG/1; MG/1
TABLET, EXTENDED RELEASE ORAL
Qty: 120 TABLET | Refills: 0 | OUTPATIENT
Start: 2025-02-03

## 2025-02-25 ENCOUNTER — PATIENT MESSAGE (OUTPATIENT)
Dept: PRIMARY CARE | Facility: CLINIC | Age: 45
End: 2025-02-25
Payer: COMMERCIAL

## 2025-02-26 ENCOUNTER — OFFICE VISIT (OUTPATIENT)
Dept: PRIMARY CARE | Facility: CLINIC | Age: 45
End: 2025-02-26
Payer: COMMERCIAL

## 2025-02-26 VITALS
HEIGHT: 66 IN | OXYGEN SATURATION: 98 % | DIASTOLIC BLOOD PRESSURE: 78 MMHG | WEIGHT: 217 LBS | BODY MASS INDEX: 34.87 KG/M2 | HEART RATE: 82 BPM | SYSTOLIC BLOOD PRESSURE: 126 MMHG

## 2025-02-26 DIAGNOSIS — R41.840 CONCENTRATION DEFICIT: Primary | ICD-10-CM

## 2025-02-26 DIAGNOSIS — E66.812 CLASS 2 OBESITY WITHOUT SERIOUS COMORBIDITY WITH BODY MASS INDEX (BMI) OF 35.0 TO 35.9 IN ADULT, UNSPECIFIED OBESITY TYPE: ICD-10-CM

## 2025-02-26 PROCEDURE — 3008F BODY MASS INDEX DOCD: CPT | Performed by: STUDENT IN AN ORGANIZED HEALTH CARE EDUCATION/TRAINING PROGRAM

## 2025-02-26 PROCEDURE — 99214 OFFICE O/P EST MOD 30 MIN: CPT | Performed by: STUDENT IN AN ORGANIZED HEALTH CARE EDUCATION/TRAINING PROGRAM

## 2025-02-26 PROCEDURE — 1036F TOBACCO NON-USER: CPT | Performed by: STUDENT IN AN ORGANIZED HEALTH CARE EDUCATION/TRAINING PROGRAM

## 2025-02-26 RX ORDER — BUPROPION HYDROCHLORIDE 150 MG/1
150 TABLET ORAL EVERY MORNING
Qty: 30 TABLET | Refills: 0 | Status: SHIPPED | OUTPATIENT
Start: 2025-02-26 | End: 2025-03-28

## 2025-02-26 ASSESSMENT — PATIENT HEALTH QUESTIONNAIRE - PHQ9
3. TROUBLE FALLING OR STAYING ASLEEP OR SLEEPING TOO MUCH: MORE THAN HALF THE DAYS
5. POOR APPETITE OR OVEREATING: SEVERAL DAYS
6. FEELING BAD ABOUT YOURSELF - OR THAT YOU ARE A FAILURE OR HAVE LET YOURSELF OR YOUR FAMILY DOWN: SEVERAL DAYS
SUM OF ALL RESPONSES TO PHQ QUESTIONS 1-9: 12
2. FEELING DOWN, DEPRESSED OR HOPELESS: NEARLY EVERY DAY
4. FEELING TIRED OR HAVING LITTLE ENERGY: NEARLY EVERY DAY
9. THOUGHTS THAT YOU WOULD BE BETTER OFF DEAD, OR OF HURTING YOURSELF: NOT AT ALL
8. MOVING OR SPEAKING SO SLOWLY THAT OTHER PEOPLE COULD HAVE NOTICED. OR THE OPPOSITE, BEING SO FIGETY OR RESTLESS THAT YOU HAVE BEEN MOVING AROUND A LOT MORE THAN USUAL: SEVERAL DAYS
1. LITTLE INTEREST OR PLEASURE IN DOING THINGS: NOT AT ALL
10. IF YOU CHECKED OFF ANY PROBLEMS, HOW DIFFICULT HAVE THESE PROBLEMS MADE IT FOR YOU TO DO YOUR WORK, TAKE CARE OF THINGS AT HOME, OR GET ALONG WITH OTHER PEOPLE: SOMEWHAT DIFFICULT
SUM OF ALL RESPONSES TO PHQ9 QUESTIONS 1 AND 2: 3
7. TROUBLE CONCENTRATING ON THINGS, SUCH AS READING THE NEWSPAPER OR WATCHING TELEVISION: SEVERAL DAYS

## 2025-02-26 ASSESSMENT — ANXIETY QUESTIONNAIRES
5. BEING SO RESTLESS THAT IT IS HARD TO SIT STILL: SEVERAL DAYS
3. WORRYING TOO MUCH ABOUT DIFFERENT THINGS: NEARLY EVERY DAY
1. FEELING NERVOUS, ANXIOUS, OR ON EDGE: NEARLY EVERY DAY
IF YOU CHECKED OFF ANY PROBLEMS ON THIS QUESTIONNAIRE, HOW DIFFICULT HAVE THESE PROBLEMS MADE IT FOR YOU TO DO YOUR WORK, TAKE CARE OF THINGS AT HOME, OR GET ALONG WITH OTHER PEOPLE: SOMEWHAT DIFFICULT
6. BECOMING EASILY ANNOYED OR IRRITABLE: MORE THAN HALF THE DAYS
GAD7 TOTAL SCORE: 17
7. FEELING AFRAID AS IF SOMETHING AWFUL MIGHT HAPPEN: NEARLY EVERY DAY
4. TROUBLE RELAXING: MORE THAN HALF THE DAYS
2. NOT BEING ABLE TO STOP OR CONTROL WORRYING: NEARLY EVERY DAY

## 2025-02-26 NOTE — PROGRESS NOTES
"Mary Dickens is a 44 y.o. year old female presenting for Obesity and concentration       HPI:  Patient presenting today because she mainly needs something to help her focus at work to get through her work days.  Her life is very stressful for financial reasons, working 2 jobs and not able to sleep more than a couple hours at night essentially.  She states she cannot really change anything at this time, so she really just needs something to help her get through her days.  She brought up Adderall or Vyvanse, but understands that these may not be able to be given based on their controlled nature.    Has also been unable to lose any weight on her own and wondering what can be done about that at this point.  Nothing was covered last time we tried, with the last one being Contrave.  She is wondering if we can try again or see if something else is covered at this time.    ROS:   All pertinent positive symptoms are included in history of present illness.    All other systems have been reviewed and are negative and noncontributory to this patient's current ailments.    No current outpatient medications on file.     No current facility-administered medications for this visit.       OBJECTIVE  Visit Vitals  /78   Pulse 82   Ht 1.676 m (5' 6\")   Wt 98.4 kg (217 lb)   SpO2 98%   BMI 35.02 kg/m²   OB Status Ablation   Smoking Status Never   BSA 2.14 m²        Physical Exam:  GENERAL: Alert, oriented, pleasant, in no acute distress  HEENT: Head normocephalic, atraumatic  EXTREMITIES: no edema, no cyanosis  PSYCH: Appropriate mood and affect  SKIN: No rashes or lesions appreciated    Assessment/Plan   Diagnoses and all orders for this visit:  Class 2 obesity without serious comorbidity with body mass index (BMI) of 35.0 to 35.9 in adult, unspecified obesity type  Recommend following up with our clinical pharmacist to discuss weight loss medication options considering she does have 2 insurances, hopefully something is " covered with one of them.  Continue positive lifestyle changes to help with weight loss as well  -     Referral to Clinical Pharmacy; Future  Concentration deficit  I explained that without an actual diagnosis of ADHD or narcolepsy, stimulant medication would be inappropriate to prescribe at this time.  Most of her symptoms come from life circumstances and sleep deprivation, but she cannot do anything about that right now.  I gave her 2 options for medications at this time including Strattera or Wellbutrin and we went with Wellbutrin to trial for 2 to 3 weeks to see if that is helpful.  If it is not helpful, we can trial Strattera.  Beyond that, there is not much that I can do for her.  She was understanding and in agreement with this plan.  -     buPROPion XL (Wellbutrin XL) 150 mg 24 hr tablet; Take 1 tablet (150 mg) by mouth once daily in the morning. Do not crush, chew, or split.

## 2025-02-26 NOTE — PROGRESS NOTES
"Subjective   Patient ID: Mary Dickens is a 44 y.o. female who presents for cpe.    HPI  {SLSphysdiet:79587::\"Diet is well balanced.\"}  {SLSphysexercise:41709::\"Exercises regularly.\"}  {SLSphyscolon:66969}  {SLSphysmamm:11458}  {SLSPhyspap:50105}  {SLSphysvacc:55174}  {SLSphysd&v:09340}  {SLSphysfast:18961}  Social: Tobacco use-       Alcohol use-    Other concerns addressed today include:     Review of Systems  All pertinent positive symptoms are included in the history of present illness.    All other systems have been reviewed and are negative and noncontributory to this patient's current ailments.     Social History     Tobacco Use    Smoking status: Never    Smokeless tobacco: Never   Substance Use Topics    Alcohol use: Not Currently      Past Surgical History:   Procedure Laterality Date    CHOLECYSTECTOMY  09/04/2015    Cholecystectomy    ENDOMETRIAL ABLATION  09/04/2015    [facility] Thermal Endometrial Ablation    FOOT SURGERY  09/04/2015    Foot Surgery    TUBAL LIGATION  09/04/2015    Tubal Ligation      No Known Allergies     No current outpatient medications on file.     No current facility-administered medications for this visit.        Patient Active Problem List   Diagnosis    Anxiety disorder    Breast hypertrophy    Depression    Gastroesophageal reflux disease    Insomnia    Migraines    Obesity    Vitamin D insufficiency    Restless leg syndrome      Immunization History   Administered Date(s) Administered    COVID-19, mRNA, LNP-S, PF, 30 mcg/0.3 mL dose 09/20/2021, 10/11/2021    Pfizer Gray Cap SARS-CoV-2 03/12/2022    Tdap vaccine, age 7 year and older (BOOSTRIX, ADACEL) 07/08/2010       Objective   VITALS  /78   Pulse 82   Ht 1.676 m (5' 6\")   Wt 98.4 kg (217 lb)   SpO2 98%   BMI 35.02 kg/m²      Physical Exam  CONSTITUTIONAL - well nourished, well developed, looks like stated age, in no acute distress, not ill-appearing  SKIN - normal skin color and pigmentation, " normal skin turgor without rash, lesions, or nodules visualized  HEAD - no trauma, normocephalic  EYES - pupils are equal and reactive to light, extraocular muscles are intact, and normal external exam  ENT - TM's intact, no injection, no signs of infection, uvula midline, normal tongue movement and throat normal, no exudate, nasal passage without discharge and patent  NECK - supple without rigidity, no neck mass was observed, no thyromegaly or thyroid nodules  CHEST - clear to auscultation, no wheezing, no crackles and no rales, good effort  CARDIAC - regular rate and regular rhythm, no skipped beats, no murmur  ABDOMEN - no organomegaly, soft, nontender, nondistended, normal bowel sounds, no guarding/rebound/rigidity  EXTREMITIES - no edema, no deformities  NEUROLOGICAL - normal gait, normal balance, normal motor, no ataxia, DTRs equal and symmetrical; alert, oriented and no focal signs  PSYCHIATRIC - alert, pleasant and cordial, age-appropriate  IMMUNOLOGIC - no cervical lymphadenopathy     Recent Results (from the past 12 weeks)   Urine Culture    Collection Time: 01/03/25  8:19 AM    Specimen: Clean Catch/Voided; Urine   Result Value Ref Range    Urine Culture No growth    POCT UA Automated manually resulted    Collection Time: 01/03/25  8:22 AM   Result Value Ref Range    POC Color, Urine Orange (A) Straw, Yellow, Light-Yellow    POC Appearance, Urine Cloudy (A) Clear    POC Glucose, Urine NEGATIVE NEGATIVE mg/dl    POC Bilirubin, Urine NEGATIVE NEGATIVE    POC Ketones, Urine NEGATIVE NEGATIVE mg/dl    POC Specific Gravity, Urine 1.015 1.005 - 1.035    POC Blood, Urine NEGATIVE NEGATIVE    POC PH, Urine 6.0 No Reference Range Established PH    POC Protein, Urine NEGATIVE NEGATIVE mg/dl    POC Urobilinogen, Urine 1.0 0.2, 1.0 EU/DL    Poc Nitrite, Urine POSITIVE (A) NEGATIVE    POC Leukocytes, Urine TRACE (A) NEGATIVE   POCT pregnancy, urine manually resulted    Collection Time: 01/03/25  8:24 AM   Result Value  Ref Range    Preg Test, Ur Negative Negative        Assessment/Plan   {Assess/PlanSmartLinks:66323}

## 2025-03-06 ENCOUNTER — TELEMEDICINE (OUTPATIENT)
Dept: PHARMACY | Facility: HOSPITAL | Age: 45
End: 2025-03-06
Payer: COMMERCIAL

## 2025-03-06 DIAGNOSIS — E66.812 CLASS 2 OBESITY WITHOUT SERIOUS COMORBIDITY WITH BODY MASS INDEX (BMI) OF 35.0 TO 35.9 IN ADULT, UNSPECIFIED OBESITY TYPE: ICD-10-CM

## 2025-03-06 NOTE — PROGRESS NOTES
Clinical Pharmacy Appointment    Patient ID: Mary Dickens is a 44 y.o. female who presents for Obesity.    Pt is here for First appointment.     Referring Provider: Nicci Willis DO  PCP: Nicci Willis DO   Last visit with PCP: 2/26/25   Next visit with PCP: Not scheduled      Subjective     HPI    OBESITY  Current Medications:   None    Previous Medications:  Metformin (ineffective)  Contrave (ineffective)  Topamax (ineffective)    Pertinent PMH:   Personal/family history of thyroid cancer: No  Personal history of pancreatitis: No  CAD: No  Heart attack: No  Stroke: No  Sleep apnea: No  Hyperlipidemia: No  Hypertension: No  PCOS: No  Fatty liver disease: No  Pre-diabetes: No    Current weight 217 lbs (current BMI 35.02) - Obesity (class 2): BMI 35 to <40    The 10-year ASCVD risk score (Omar SUTTON, et al., 2019) is: 0.5%    Values used to calculate the score:      Age: 44 years      Sex: Female      Is Non- : No      Diabetic: No      Tobacco smoker: No      Systolic Blood Pressure: 126 mmHg      Is BP treated: No      HDL Cholesterol: 51.9 mg/dL      Total Cholesterol: 163 mg/dL     Drug Interactions  No relevant drug interactions were noted.    Medication System Management  Patient's preferred pharmacy: [facility]  Adherence/Organization: No concerns  Affordability/Accessibility:  GLP1 coverage issues      Objective   No Known Allergies  Social History     Social History Narrative    Not on file      Medication Review  Current Outpatient Medications   Medication Instructions    buPROPion XL (WELLBUTRIN XL) 150 mg, oral, Every morning, Do not crush, chew, or split.      Vitals  BP Readings from Last 3 Encounters:   02/26/25 126/78   01/03/25 143/90   11/11/24 124/74     Labs  A1C  Lab Results   Component Value Date    HGBA1C 5.4 11/11/2024    HGBA1C 5.5 09/18/2023     BMP  Lab Results   Component Value Date    CALCIUM 9.2 11/11/2024     11/11/2024    K 4.7 11/11/2024  "   CO2 29 11/11/2024     11/11/2024    BUN 21 11/11/2024    CREATININE 0.90 11/11/2024    EGFR 82 11/11/2024     LFTs  Lab Results   Component Value Date    ALT 24 11/11/2024    AST 23 11/11/2024    ALKPHOS 54 11/11/2024    BILITOT 0.5 11/11/2024     FLP  Lab Results   Component Value Date    TRIG 66 11/11/2024    CHOL 163 11/11/2024    LDLF 73 09/18/2023    LDLCALC 98 11/11/2024    HDL 51.9 11/11/2024     Urine Microalbumin  No results found for: \"MICROALBCREA\"  Weight Management  Wt Readings from Last 3 Encounters:   02/26/25 98.4 kg (217 lb)   11/11/24 95.3 kg (210 lb)   05/29/24 101 kg (222 lb)      BMI Readings from Last 3 Encounters:   02/26/25 35.02 kg/m²   11/11/24 33.89 kg/m²   05/29/24 35.83 kg/m²        Assessment/Plan   Problem List Items Addressed This Visit       Obesity   Current weight 217 lbs (current BMI 35.02) - Obesity (class 2): BMI 35 to <40. Patient is interested in starting a weight loss medication. Zepbound and Wegovy are plan limit exclusions through /[employer] insurance. Patient also has [employer] insurance but pre-population billing information is a mismatch so test claim is not working. Will have patient send a picture of her [employer] card and send to  [facility] for billing. Will have them try to bill Wegovy and Zepbound.     Clinical Pharmacist follow-up: TBD    No future appointments.      Continue all meds under the continuation of care with the referring provider and clinical pharmacy team.    Thank you,  Nayely Machado  Clinical Pharmacist  [phone]    Verbal consent to manage patient's drug therapy was obtained from the patient. They were informed they may decline to participate or withdraw from participation in pharmacy services at any time.  "

## 2025-08-05 ENCOUNTER — APPOINTMENT (OUTPATIENT)
Dept: PRIMARY CARE | Facility: CLINIC | Age: 45
End: 2025-08-05
Payer: COMMERCIAL

## 2025-08-05 VITALS
DIASTOLIC BLOOD PRESSURE: 89 MMHG | HEART RATE: 81 BPM | WEIGHT: 207 LBS | OXYGEN SATURATION: 97 % | SYSTOLIC BLOOD PRESSURE: 132 MMHG | HEIGHT: 66 IN | BODY MASS INDEX: 33.27 KG/M2

## 2025-08-05 DIAGNOSIS — Z00.00 HEALTH CARE MAINTENANCE: ICD-10-CM

## 2025-08-05 DIAGNOSIS — Z13.29 SCREENING FOR ENDOCRINE, NUTRITIONAL, METABOLIC AND IMMUNITY DISORDER: ICD-10-CM

## 2025-08-05 DIAGNOSIS — F41.1 GENERALIZED ANXIETY DISORDER: ICD-10-CM

## 2025-08-05 DIAGNOSIS — F32.A DEPRESSION, UNSPECIFIED DEPRESSION TYPE: ICD-10-CM

## 2025-08-05 DIAGNOSIS — Z13.0 SCREENING FOR ENDOCRINE, NUTRITIONAL, METABOLIC AND IMMUNITY DISORDER: ICD-10-CM

## 2025-08-05 DIAGNOSIS — Z13.21 SCREENING FOR ENDOCRINE, NUTRITIONAL, METABOLIC AND IMMUNITY DISORDER: ICD-10-CM

## 2025-08-05 DIAGNOSIS — Z13.228 SCREENING FOR ENDOCRINE, NUTRITIONAL, METABOLIC AND IMMUNITY DISORDER: ICD-10-CM

## 2025-08-05 DIAGNOSIS — Z12.31 ENCOUNTER FOR SCREENING MAMMOGRAM FOR MALIGNANT NEOPLASM OF BREAST: Primary | ICD-10-CM

## 2025-08-05 DIAGNOSIS — F90.9 ADULT ADHD: ICD-10-CM

## 2025-08-05 PROBLEM — K21.9 GASTROESOPHAGEAL REFLUX DISEASE: Status: RESOLVED | Noted: 2023-09-15 | Resolved: 2025-08-05

## 2025-08-05 PROBLEM — G43.909 MIGRAINES: Status: RESOLVED | Noted: 2023-09-15 | Resolved: 2025-08-05

## 2025-08-05 PROCEDURE — 90715 TDAP VACCINE 7 YRS/> IM: CPT

## 2025-08-05 PROCEDURE — 99214 OFFICE O/P EST MOD 30 MIN: CPT

## 2025-08-05 PROCEDURE — 3008F BODY MASS INDEX DOCD: CPT

## 2025-08-05 PROCEDURE — 1036F TOBACCO NON-USER: CPT

## 2025-08-05 PROCEDURE — 90471 IMMUNIZATION ADMIN: CPT

## 2025-08-05 RX ORDER — LISDEXAMFETAMINE DIMESYLATE 20 MG/1
20 CAPSULE ORAL EVERY MORNING
Qty: 7 CAPSULE | Refills: 0 | Status: SHIPPED | OUTPATIENT
Start: 2025-08-05 | End: 2025-08-12

## 2025-08-05 ASSESSMENT — ENCOUNTER SYMPTOMS
DEPRESSION: 0
CONSTITUTIONAL NEGATIVE: 1
HYPERACTIVE: 1
ALLERGIC/IMMUNOLOGIC NEGATIVE: 1
EYES NEGATIVE: 1
NERVOUS/ANXIOUS: 1
HEMATOLOGIC/LYMPHATIC NEGATIVE: 1
RESPIRATORY NEGATIVE: 1
DECREASED CONCENTRATION: 1
MUSCULOSKELETAL NEGATIVE: 1
OCCASIONAL FEELINGS OF UNSTEADINESS: 0
ENDOCRINE NEGATIVE: 1
CARDIOVASCULAR NEGATIVE: 1
GASTROINTESTINAL NEGATIVE: 1
LOSS OF SENSATION IN FEET: 0
NEUROLOGICAL NEGATIVE: 1

## 2025-08-05 ASSESSMENT — ANXIETY QUESTIONNAIRES
7. FEELING AFRAID AS IF SOMETHING AWFUL MIGHT HAPPEN: NOT AT ALL
2. NOT BEING ABLE TO STOP OR CONTROL WORRYING: NEARLY EVERY DAY
1. FEELING NERVOUS, ANXIOUS, OR ON EDGE: NEARLY EVERY DAY
3. WORRYING TOO MUCH ABOUT DIFFERENT THINGS: NEARLY EVERY DAY
GAD7 TOTAL SCORE: 16
5. BEING SO RESTLESS THAT IT IS HARD TO SIT STILL: NEARLY EVERY DAY
4. TROUBLE RELAXING: NEARLY EVERY DAY
IF YOU CHECKED OFF ANY PROBLEMS ON THIS QUESTIONNAIRE, HOW DIFFICULT HAVE THESE PROBLEMS MADE IT FOR YOU TO DO YOUR WORK, TAKE CARE OF THINGS AT HOME, OR GET ALONG WITH OTHER PEOPLE: NOT DIFFICULT AT ALL
6. BECOMING EASILY ANNOYED OR IRRITABLE: SEVERAL DAYS

## 2025-08-05 NOTE — ASSESSMENT & PLAN NOTE
Assessed and stable. SANTOS score of 16. Was on wellbutrin but did not help with anxiety. States life makes her anxious nothing will help until it slows down. Will continue to monitor. Follow up 3 months.

## 2025-08-05 NOTE — PROGRESS NOTES
"Subjective   Patient ID: Mary Dickens is a 44 y.o. female who presents for Establish Care (Establish care, no concerns.).    Past Medical, Surgical, and Family History reviewed and updated in chart.     Reviewed all medications by prescribing practitioner or clinical pharmacist (such as prescriptions, OTCs, herbal therapies and supplements) and documented in the medical record.    HPI   44 yof in office to establish care. Previously followed with PCP in [place].   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   Has hard time with focus and concentration. Going from one job to another. Has tried Wellbutrin, Topamax. Was not effective.     Ablation/tubal 14 years ago. Has not had a period since. Endorses trouble sleeping, hot flashes at time. Mind races at night unable to stop.     Review of Systems   Constitutional: Negative.    HENT: Negative.     Eyes: Negative.    Respiratory: Negative.     Cardiovascular: Negative.    Gastrointestinal: Negative.    Endocrine: Negative.    Genitourinary: Negative.    Musculoskeletal: Negative.    Skin: Negative.    Allergic/Immunologic: Negative.    Neurological: Negative.    Hematological: Negative.    Psychiatric/Behavioral:  Positive for decreased concentration. The patient is nervous/anxious and is hyperactive.    All other systems reviewed and are negative.      Objective   /89   Pulse 81   Ht 1.676 m (5' 5.98\")   Wt 93.9 kg (207 lb)   SpO2 97%   BMI 33.43 kg/m²     Physical Exam  Constitutional:       Appearance: Normal appearance.   HENT:      Head: Normocephalic.     Cardiovascular:      Rate and Rhythm: Normal rate and regular rhythm.      Pulses: Normal pulses.      Heart sounds: Normal heart sounds.   Pulmonary:      Effort: Pulmonary effort is normal.      Breath sounds: Normal breath sounds.     Neurological:      Mental Status: She is alert and oriented to person, place, and time.     Psychiatric:         " Attention and Perception: Attention normal.         Mood and Affect: Mood is anxious.         Speech: Speech normal.         Behavior: Behavior is hyperactive.         Assessment/Plan   Problem List Items Addressed This Visit       Anxiety disorder    Assessed and stable. SANTOS score of 16. Was on wellbutrin but did not help with anxiety. States life makes her anxious nothing will help until it slows down. Will continue to monitor. Follow up 3 months.          Depression    Relevant Medications    lisdexamfetamine (Vyvanse) 20 mg capsule     Other Visit Diagnoses         Encounter for screening mammogram for malignant neoplasm of breast    -  Primary    Relevant Orders    BI mammo bilateral screening tomosynthesis      [facility] maintenance        lab work ordered   mammogram ordered    Relevant Orders    Referral to Gynecology      Adult ADHD        BAARS-IV completed, patient did score high with  hyperactive. will trial 7 days vyvanse 20mg to see if this helps with focus/concentraion at work and home life.    Relevant Medications    lisdexamfetamine (Vyvanse) 20 mg capsule    Other Relevant Orders    Follow Up In [facility] - PCP - Established      Screening for endocrine, nutritional, metabolic and immunity disorder        Relevant Orders    CBC and Auto Differential    Lipid Panel    Comprehensive Metabolic Panel    Hemoglobin A1C    TSH with reflex to Free T4 if abnormal      BMI 33.0-33.9,adult        Relevant Orders    CBC and Auto Differential    Comprehensive Metabolic Panel

## 2025-08-05 NOTE — PATIENT INSTRUCTIONS
Magnesium glycinate, coQ10 nightly.     I am ordering labs for you to get when you are fasting (meaning nothing to eat or drink for at least 12 hours before, water and black coffee are okay). Once we get the results, someone from the office will call you. If you do not hear from someone within one week of having your blood drawn, please call us [phone] so that we can go over the results.    It is recommend that you consume a balanced diet to include: fruits, a variety of vegetables (dark green, red and orange, legumes like beans and peas, starch, other), grains (at least half of which are whole grains), fat-free or low-fat dairy including milk,cheese, or fortified soy beverages, and proteins such as lean means, poultry, fish, eggs, nuts, seeds.   Limit processed foods, saturated and trans fats, added sugars and sodium (salt).     It is recommended that you get at least 150min exercise every week. More is even better. Moderate activities are activities that get your heart beating faster but you are still able to carry on a conversation. Vigorous activities will have you take breaths after a few words. You should also include two days of muscle strengthening activities to include all major muscle groups (arms, shoulders, chest, abdomen, back, hips and legs)    Benefits of keeping active include:  Lowering your risk of developing type II diabetes and some cancers  Control your blood pressure  Maintain a healthy weight  Improving mood and managing stress  Improving sleep

## 2025-08-18 ENCOUNTER — HOSPITAL ENCOUNTER (OUTPATIENT)
Dept: RADIOLOGY | Facility: HOSPITAL | Age: 45
Discharge: HOME | End: 2025-08-18
Payer: COMMERCIAL

## 2025-08-18 VITALS — HEIGHT: 66 IN | BODY MASS INDEX: 33.27 KG/M2 | WEIGHT: 207 LBS

## 2025-08-18 DIAGNOSIS — Z12.31 ENCOUNTER FOR SCREENING MAMMOGRAM FOR MALIGNANT NEOPLASM OF BREAST: ICD-10-CM

## 2025-08-18 PROCEDURE — 77063 BREAST TOMOSYNTHESIS BI: CPT

## 2025-08-18 PROCEDURE — 77067 SCR MAMMO BI INCL CAD: CPT

## 2025-08-20 ENCOUNTER — APPOINTMENT (OUTPATIENT)
Dept: PRIMARY CARE | Facility: CLINIC | Age: 45
End: 2025-08-20
Payer: COMMERCIAL

## 2025-08-21 ENCOUNTER — CLINICAL SUPPORT (OUTPATIENT)
Dept: PRIMARY CARE | Facility: CLINIC | Age: 45
End: 2025-08-21
Payer: COMMERCIAL

## 2025-08-21 DIAGNOSIS — Z51.81 THERAPEUTIC DRUG MONITORING: Primary | ICD-10-CM

## 2025-08-21 DIAGNOSIS — F90.9 ADULT ADHD: ICD-10-CM

## 2025-08-21 RX ORDER — LISDEXAMFETAMINE DIMESYLATE 20 MG/1
20 CAPSULE ORAL EVERY MORNING
Qty: 30 CAPSULE | Refills: 0 | Status: SHIPPED | OUTPATIENT
Start: 2025-08-21 | End: 2025-09-20

## 2025-08-22 LAB
ALBUMIN SERPL-MCNC: 4.4 G/DL (ref 3.6–5.1)
ALP SERPL-CCNC: 47 U/L (ref 31–125)
ALT SERPL-CCNC: 19 U/L (ref 6–29)
ANION GAP SERPL CALCULATED.4IONS-SCNC: 8 MMOL/L (CALC) (ref 7–17)
AST SERPL-CCNC: 14 U/L (ref 10–30)
BASOPHILS # BLD AUTO: 49 CELLS/UL (ref 0–200)
BASOPHILS NFR BLD AUTO: 0.6 %
BILIRUB SERPL-MCNC: 0.5 MG/DL (ref 0.2–1.2)
BUN SERPL-MCNC: 14 MG/DL (ref 7–25)
CALCIUM SERPL-MCNC: 9.3 MG/DL (ref 8.6–10.2)
CHLORIDE SERPL-SCNC: 103 MMOL/L (ref 98–110)
CHOLEST SERPL-MCNC: 116 MG/DL
CHOLEST/HDLC SERPL: 2.8 (CALC)
CO2 SERPL-SCNC: 27 MMOL/L (ref 20–32)
CREAT SERPL-MCNC: 0.88 MG/DL (ref 0.5–0.99)
EGFRCR SERPLBLD CKD-EPI 2021: 84 ML/MIN/1.73M2
EOSINOPHIL # BLD AUTO: 73 CELLS/UL (ref 15–500)
EOSINOPHIL NFR BLD AUTO: 0.9 %
ERYTHROCYTE [DISTWIDTH] IN BLOOD BY AUTOMATED COUNT: 12.7 % (ref 11–15)
EST. AVERAGE GLUCOSE BLD GHB EST-MCNC: 105 MG/DL
EST. AVERAGE GLUCOSE BLD GHB EST-SCNC: 5.8 MMOL/L
GLUCOSE SERPL-MCNC: 96 MG/DL (ref 65–99)
HBA1C MFR BLD: 5.3 %
HCT VFR BLD AUTO: 39.9 % (ref 35–45)
HDLC SERPL-MCNC: 41 MG/DL
HGB BLD-MCNC: 12.8 G/DL (ref 11.7–15.5)
LDLC SERPL CALC-MCNC: 59 MG/DL (CALC)
LYMPHOCYTES # BLD AUTO: 2535 CELLS/UL (ref 850–3900)
LYMPHOCYTES NFR BLD AUTO: 31.3 %
MCH RBC QN AUTO: 30.4 PG (ref 27–33)
MCHC RBC AUTO-ENTMCNC: 32.1 G/DL (ref 32–36)
MCV RBC AUTO: 94.8 FL (ref 80–100)
MONOCYTES # BLD AUTO: 567 CELLS/UL (ref 200–950)
MONOCYTES NFR BLD AUTO: 7 %
NEUTROPHILS # BLD AUTO: 4876 CELLS/UL (ref 1500–7800)
NEUTROPHILS NFR BLD AUTO: 60.2 %
NONHDLC SERPL-MCNC: 75 MG/DL (CALC)
PLATELET # BLD AUTO: 222 THOUSAND/UL (ref 140–400)
PMV BLD REES-ECKER: 12 FL (ref 7.5–12.5)
POTASSIUM SERPL-SCNC: 3.9 MMOL/L (ref 3.5–5.3)
PROT SERPL-MCNC: 7.3 G/DL (ref 6.1–8.1)
RBC # BLD AUTO: 4.21 MILLION/UL (ref 3.8–5.1)
SODIUM SERPL-SCNC: 138 MMOL/L (ref 135–146)
TRIGL SERPL-MCNC: 77 MG/DL
TSH SERPL-ACNC: 2.05 MIU/L
WBC # BLD AUTO: 8.1 THOUSAND/UL (ref 3.8–10.8)

## 2025-08-26 LAB — QUEST FLEXITEST1 RESULTS:: NORMAL

## 2025-11-05 ENCOUNTER — APPOINTMENT (OUTPATIENT)
Dept: PRIMARY CARE | Facility: CLINIC | Age: 45
End: 2025-11-05
Payer: COMMERCIAL

## 2025-12-08 ENCOUNTER — APPOINTMENT (OUTPATIENT)
Dept: OBSTETRICS AND GYNECOLOGY | Facility: CLINIC | Age: 45
End: 2025-12-08
Payer: COMMERCIAL